# Patient Record
Sex: MALE | Race: WHITE
[De-identification: names, ages, dates, MRNs, and addresses within clinical notes are randomized per-mention and may not be internally consistent; named-entity substitution may affect disease eponyms.]

---

## 2017-09-29 ENCOUNTER — HOSPITAL ENCOUNTER (EMERGENCY)
Dept: HOSPITAL 25 - UCCORT | Age: 32
Discharge: HOME | End: 2017-09-29
Payer: SELF-PAY

## 2017-09-29 VITALS — SYSTOLIC BLOOD PRESSURE: 115 MMHG | DIASTOLIC BLOOD PRESSURE: 60 MMHG

## 2017-09-29 DIAGNOSIS — F17.210: ICD-10-CM

## 2017-09-29 DIAGNOSIS — K04.7: Primary | ICD-10-CM

## 2017-09-29 PROCEDURE — 99212 OFFICE O/P EST SF 10 MIN: CPT

## 2017-09-29 PROCEDURE — G0463 HOSPITAL OUTPT CLINIC VISIT: HCPCS

## 2017-09-29 NOTE — UC
Throat Pain/Nasal Andres HPI





- HPI Summary


HPI Summary: 





32 YEAR OLD MALE PRESENTS WITH RIGHT LOWER MOLAR ABSCESS.





- History of Current Complaint


Stated Complaint: LUMP UNDER JAW,THROAT/EAR PAIN


Time Seen by Provider: 09/29/17 19:49


Hx Obtained From: Patient


Onset/Duration: Sudden Onset


Severity: Moderate


Pain Scale Used: 0-10 Numeric - 8





- Allergies/Home Medications


Allergies/Adverse Reactions: 


 Allergies











Allergy/AdvReac Type Severity Reaction Status Date / Time


 


No Known Allergies Allergy   Verified 04/13/15 19:58











Home Medications: 


 Home Medications





Aleve 220mg 660 mg PO Q4H PRN 09/29/17 [History Confirmed 09/29/17]


Amoxicillin PO (*) [Amoxicillin 500 MG CAP*] 500 mg PO TID 09/29/17 [History 

Confirmed 09/29/17]











PMH/Surg Hx/FS Hx/Imm Hx


Previously Healthy: Yes





- Surgical History


Surgical History: None





- Social History


Alcohol Use: Rare


Substance Use Type: None


Smoking Status (MU): Heavy Every Day Tobacco Smoker


Type: Cigarettes


Amount Used/How Often: 1 ppd


Length of Time of Smoking/Using Tobacco: 10 yrs


Have You Smoked in the Last Year: Yes





- Immunization History


Most Recent Tetanus Shot: 2-3 yrs ago





Review of Systems


Constitutional: Negative


Skin: Negative


Eyes: Negative


ENT: Dental Pain


Respiratory: Negative


Cardiovascular: Negative


Gastrointestinal: Negative


Genitourinary: Negative


Motor: Negative


Neurovascular: Negative


Musculoskeletal: Negative


Neurological: Negative


Psychological: Negative


All Other Systems Reviewed And Are Negative: Yes





Physical Exam


Triage Information Reviewed: Yes


Vital Signs Reviewed: Yes


Eye Exam: Normal


ENT Exam: Normal


Dental: Positive: Abscess @


Neck exam: Normal


Neck: Positive: 1


Respiratory Exam: Normal


Cardiovascular Exam: Normal


Abdominal Exam: Normal


Musculoskeletal Exam: Normal


Neurological Exam: Normal


Psychological Exam: Normal


Skin Exam: Normal





Throat Pain/Nasal Course/Dx





- Differential Dx/Diagnosis


Provider Diagnoses: RIGHT MOLAR ABSCESS





Discharge





- Discharge Plan


Condition: Stable


Disposition: HOME


Prescriptions: 


Chlorhexidine MW 0.12% 473ML* [Peridex Mouth Wash 0.12%*] 15 ml MT TID PC #1 btl


Clindamycin HCl [Clindamycin 150 MG CAP*] 300 mg PO TID #21 cap


Naproxen [Naprosyn 500 mg] 500 mg PO BID PRN #30 tab


 PRN Reason: Pain


Patient Education Materials:  Abscess (ED)


Referrals: 


No Primary Care Phys,NOPCP [Primary Care Provider] -

## 2017-10-27 ENCOUNTER — HOSPITAL ENCOUNTER (EMERGENCY)
Dept: HOSPITAL 25 - UCCORT | Age: 32
Discharge: HOME | End: 2017-10-27
Payer: SELF-PAY

## 2017-10-27 VITALS — DIASTOLIC BLOOD PRESSURE: 79 MMHG | SYSTOLIC BLOOD PRESSURE: 121 MMHG

## 2017-10-27 DIAGNOSIS — K04.7: Primary | ICD-10-CM

## 2017-10-27 DIAGNOSIS — F17.210: ICD-10-CM

## 2017-10-27 PROCEDURE — G0463 HOSPITAL OUTPT CLINIC VISIT: HCPCS

## 2017-10-27 PROCEDURE — 99212 OFFICE O/P EST SF 10 MIN: CPT

## 2017-10-27 NOTE — UC
Dental HPI





- HPI Summary


HPI Summary: 





patient has had increased pain and pressure in the lower jaw, multiple broken 

molar and peridontal disease noted.large amount of swelling.





- History of Current Complaint


Chief Complaint: UCDentalProblem


Stated Complaint: DENTAL


Time Seen by Provider: 10/27/17 19:13


Hx Obtained From: Patient


Onset/Duration: Sudden Onset, Lasting Days


Severity: Moderate


Aggravating Factor(s): Chewing


Alleviating Factor(s): Nothing


Related History: Previous Dental Care on Same Tooth, Swelling





- Allergies/Home Medications


Allergies/Adverse Reactions: 


 Allergies











Allergy/AdvReac Type Severity Reaction Status Date / Time


 


No Known Allergies Allergy   Verified 10/27/17 19:13














PMH/Surg Hx/FS Hx/Imm Hx


Previously Healthy: Yes





- Surgical History


Surgical History: None





- Family History


Known Family History: Positive: Hypertension





- Social History


Alcohol Use: Rare


Substance Use Type: None


Smoking Status (MU): Heavy Every Day Tobacco Smoker


Type: Cigarettes


Amount Used/How Often: 1 ppd


Length of Time of Smoking/Using Tobacco: 10 yrs


Have You Smoked in the Last Year: Yes





- Immunization History


Most Recent Tetanus Shot: 2-3 yrs ago





Review of Systems


Constitutional: Negative


Skin: Negative


Eyes: Negative


ENT: Dental Pain


Respiratory: Negative


Cardiovascular: Negative


Gastrointestinal: Negative


Genitourinary: Negative


Motor: Negative


Neurovascular: Negative


Musculoskeletal: Negative


Neurological: Negative


Psychological: Negative


Is Patient Immunocompromised?: No


All Other Systems Reviewed And Are Negative: Yes





Physical Exam


Triage Information Reviewed: Yes


Appearance: Well-Appearing, Well-Nourished, Pain Distress


Vital Signs: 


 Initial Vital Signs











Temp  98.8 F   10/27/17 19:10


 


Pulse  85   10/27/17 19:10


 


Resp  16   10/27/17 19:10


 


BP  121/79   10/27/17 19:10


 


Pulse Ox  98   10/27/17 19:10











Vital Signs Reviewed: Yes


Eye Exam: Normal


ENT Exam: Normal


ENT: Positive: Pharynx normal, Pharyngeal erythema, TMs normal


Dental Exam: Normal


Dental: Positive: Percussion Tenderness @, Gross Decay/Caries @, Dental 

Fracture @, Abscess @ - lower right jaw, Cervical Lymphadenopathy - right side


Neck exam: Normal


Neck: Positive: Supple, Nontender, No Lymphadenopathy


Respiratory Exam: Normal


Respiratory: Positive: Chest non-tender, Lungs clear, Normal breath sounds


Cardiovascular Exam: Normal


Cardiovascular: Positive: RRR, No Murmur, Pulses Normal


Abdominal Exam: Normal


Abdomen Description: Positive: Nontender, No Organomegaly, Soft


Bowel Sounds: Positive: Present


Musculoskeletal Exam: Normal


Musculoskeletal: Positive: Strength Intact, ROM Intact, No Edema


Neurological Exam: Normal


Neurological: Positive: Alert, Muscle Tone Normal


Psychological Exam: Normal


Skin Exam: Normal





Dental Complaint Course/Dx





- Course


Course Of Treatment: hx obtained, exam performed ,med reviewed, treated for 

large dental abcess





- Differential Dx/Diagnosis


Differential Diagnosis/Dx: Dental Abscess, Dental Caries, Fractured Tooth, 

Peridontic Disease


Provider Diagnoses: dental abcess of bottom molars





Discharge





- Discharge Plan


Condition: Stable


Disposition: HOME


Prescriptions: 


Penicillin  MG TAB(NF) [Penicillin  mg Tab] 500 mg PO QID #28 tab


Patient Education Materials:  Dental Abscess (ED)


Referrals: 


No Primary Care Phys,NOPCP [Primary Care Provider] - 


Additional Instructions: 


1. take the medication as prescribed.


2. Increase warm compresses and gargling


3. Eat as tolerated


4. continue with ibuprofen for pain

## 2019-04-25 ENCOUNTER — HOSPITAL ENCOUNTER (EMERGENCY)
Dept: HOSPITAL 25 - UCCORT | Age: 34
Discharge: HOME | End: 2019-04-25
Payer: COMMERCIAL

## 2019-04-25 VITALS — DIASTOLIC BLOOD PRESSURE: 80 MMHG | SYSTOLIC BLOOD PRESSURE: 127 MMHG

## 2019-04-25 DIAGNOSIS — F17.210: ICD-10-CM
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
## 2019-04-25 DIAGNOSIS — S61.235A: Primary | ICD-10-CM

## 2019-04-25 DIAGNOSIS — S61.431A: ICD-10-CM

## 2019-04-25 PROCEDURE — G0463 HOSPITAL OUTPT CLINIC VISIT: HCPCS

## 2019-04-25 PROCEDURE — 99212 OFFICE O/P EST SF 10 MIN: CPT

## 2019-04-25 PROCEDURE — 90471 IMMUNIZATION ADMIN: CPT

## 2019-04-25 PROCEDURE — 90715 TDAP VACCINE 7 YRS/> IM: CPT

## 2019-04-25 NOTE — UC
Hand/Wrist HPI





- HPI Summary


HPI Summary: 





Went under his house today to shut off a valve, lost his balance and landed 

with his left hand onto a board with protruding nails, punctured the proximal 

thenar eminence and medial 4th digit. Washed the wound without pressure and 

went to work for the afternoon, with increasing pain over the course of the 

day. 


Following the injury, he returned to his work as an , and he was 

working on construction of a barn roof, with wounds covered. He states that he 

did not think that he did anything to contaminate the wounds. 





- History Of Current Complaint


Chief Complaint: Yoel


Stated Complaint: PUNCTURE WOUNDS - LEFT HAND


Time Seen by Provider: 04/25/19 19:23


Hx Obtained From: Patient


Onset/Duration: Sudden Onset


Severity Initially: Moderate


Severity Currently: Severe


Pain Intensity: 9


Character Of Pain: Aching, Throbbing


Aggravating Factor(s): Movement, Flexion, Extension


Alleviating Factor(s): Nothing - had not taken analgesics


Associated Signs And Symptoms: Positive: Swelling


Related History: Dominant Hand Right





- Risk Factors


Compartment Syndrome Risk Factors: Pain





- Allergies/Home Medications


Allergies/Adverse Reactions: 


 Allergies











Allergy/AdvReac Type Severity Reaction Status Date / Time


 


No Known Allergies Allergy   Verified 04/25/19 19:23














PMH/Surg Hx/FS Hx/Imm Hx


Previously Healthy: Yes





- Surgical History


Surgical History: None





- Family History


Known Family History: Positive: Hypertension





- Social History


Occupation: Employed Full-time


Lives: With Family


Alcohol Use: Rare


Substance Use Type: None


Smoking Status (MU): Heavy Every Day Tobacco Smoker


Type: Cigarettes


Amount Used/How Often: 1 ppd


Length of Time of Smoking/Using Tobacco: 10 yrs


Have You Smoked in the Last Year: Yes





- Immunization History


Most Recent Tetanus Shot: 2008





Review of Systems


All Other Systems Reviewed And Are Negative: Yes


Musculoskeletal: Positive: Arthralgia, Decreased ROM, Myalgia





Physical Exam


Triage Information Reviewed: Yes


Appearance: Well-Appearing, Well-Nourished, Pain Distress - moderate


Vital Signs: 


 Initial Vital Signs











Temp  98.2 F   04/25/19 19:16


 


Pulse  89   04/25/19 19:16


 


Resp  16   04/25/19 19:16


 


BP  127/80   04/25/19 19:16


 


Pulse Ox  97   04/25/19 19:16











Respiratory: Positive: Lungs clear, Normal breath sounds


Cardiovascular: Positive: RRR, No Murmur


Musculoskeletal Exam: Other - Swelling of the hypothenar eminence with crusted 

puncture wound approx 6 cm. Puncture wound with crust proximal medial left 

fourth digit. Areas are tender to palpation. No joint swelling


Musculoskeletal: Positive: ROM Intact - passive movment of the wrist, ROM 

Limited @ - pain with extension of fingers.


Neurological Exam: Normal


Neurological: Positive: Alert


Skin Exam: Other - Faint erythema over the PW ulnar border of the hand, no 

warmth, no lymphangitis.





Diagnostics





- Radiology


  ** left hand


Radiology Interpretation Completed By: ED Physician - No fracture or retained 

foreign body seen. + swelling of hypothenar eminence.





Hand/Wrist Course/Dx





- Course


Course Of Treatment: 





cephalexin given for prevention of infection. Advised must go to the ER if pain 

and swelling progress. 


Tetanus booster given


Reviewed [facility] number [id number]





- Differential Dx/Diagnosis


Differential Diagnosis/HQI/PQRI: Cellulitis, Puncture Wound


Provider Diagnosis: 


 Puncture wound of hand, left, Puncture wound of finger of left hand








Discharge





- Sign-Out/Discharge


Documenting (check all that apply): Patient Departure


All imaging exams completed and their final reports reviewed: No





- Discharge Plan


Condition: Stable


Disposition: HOME


Prescriptions: 


Cephalexin CAP* [Keflex 500 CAP*] 500 mg PO QID #28 cap


HYDROcodone/ACETAMIN 5-325 MG* [Norco 5-325 TAB*] 2 tab PO Q6H PRN #10 tab MDD 8


 PRN Reason: Pain


Patient Education Materials:  Puncture Wound (ED), Diphtheria/Acellular 

Pertussis/Tetanus Booster Vaccine (By injection)


Referrals: 


No Primary Care Phys,NOPCP [Primary Care Provider] - 


Additional Instructions: 


I suggest soaking your hand for 10 to 15 minutes in warm soapy water or Epsom 

salts. 


Take the next dose of cephalexin before bed tonight to ensure a good high dose 

of antibiotics are on board to decrease the risk of infection. 


If there is increased pain or swelling, you must go to the emergency room for 

assessment. 


Keep your hand elevated as best as possible. 


Use ibuprofen 800mg every 6 hours as needed for pain. Take with food and stop 

if it upsets your stomach. 


You have a prescription for short term use of hydrocodone for pain control


You received a tetanus booster tonight.  





- Billing Disposition and Condition


Condition: STABLE


Disposition: Home

## 2019-04-26 NOTE — UC
- Progress Note


Progress Note: 





xray report left hand : IMPRESSION: No foreign body or subcutaneous air is 

noted. No fracture is noted.








Course/Dx





- Diagnoses


Provider Diagnoses: 


 Puncture wound of hand, left, Puncture wound of finger of left hand








Discharge





- Sign-Out/Discharge


Documenting (check all that apply): Patient Departure


All imaging exams completed and their final reports reviewed: Yes





- Discharge Plan


Condition: Stable


Disposition: HOME


Prescriptions: 


Cephalexin CAP* [Keflex 500 CAP*] 500 mg PO QID #28 cap


HYDROcodone/ACETAMIN 5-325 MG* [Norco 5-325 TAB*] 2 tab PO Q6H PRN #10 tab MDD 8


 PRN Reason: Pain


Patient Education Materials:  Diphtheria/Acellular Pertussis/Tetanus Booster 

Vaccine (By injection), Puncture Wound (ED)


Referrals: 


No Primary Care Phys,NOPCP [Primary Care Provider] - 


Additional Instructions: 


I suggest soaking your hand for 10 to 15 minutes in warm soapy water or Epsom 

salts. 


Take the next dose of cephalexin before bed tonight to ensure a good high dose 

of antibiotics are on board to decrease the risk of infection. 


If there is increased pain or swelling, you must go to the emergency room for 

assessment. 


Keep your hand elevated as best as possible. 


Use ibuprofen 800mg every 6 hours as needed for pain. Take with food and stop 

if it upsets your stomach. 


You have a prescription for short term use of hydrocodone for pain control


You received a tetanus booster tonight.  





- Billing Disposition and Condition


Condition: STABLE


Disposition: Home

## 2019-07-08 ENCOUNTER — HOSPITAL ENCOUNTER (EMERGENCY)
Dept: HOSPITAL 25 - UCCORT | Age: 34
Discharge: HOME | End: 2019-07-08
Payer: COMMERCIAL

## 2019-07-08 VITALS — SYSTOLIC BLOOD PRESSURE: 110 MMHG | DIASTOLIC BLOOD PRESSURE: 71 MMHG

## 2019-07-08 DIAGNOSIS — J02.9: ICD-10-CM

## 2019-07-08 DIAGNOSIS — R05: ICD-10-CM

## 2019-07-08 DIAGNOSIS — F17.210: ICD-10-CM

## 2019-07-08 DIAGNOSIS — J06.9: Primary | ICD-10-CM

## 2019-07-08 PROCEDURE — 71046 X-RAY EXAM CHEST 2 VIEWS: CPT

## 2019-07-08 PROCEDURE — 87651 STREP A DNA AMP PROBE: CPT

## 2019-07-08 PROCEDURE — 99212 OFFICE O/P EST SF 10 MIN: CPT

## 2019-07-08 PROCEDURE — G0463 HOSPITAL OUTPT CLINIC VISIT: HCPCS

## 2019-07-08 NOTE — UC
UC General HPI





- HPI Summary


HPI Summary: 





pt is c/o head congestion, sore throat, cough, chest congestion, f/c's and 

bodyaches since yesterday.


+ SOB on exertion.


No cp or hx asthma





- History of Current Complaint


Chief Complaint: UCGeneralIllness


Stated Complaint: BODY ACHES,FATIGUE,SOB


Time Seen by Provider: 07/08/19 18:57


Hx Obtained From: Patient


Onset/Duration: Gradual Onset


Timing: Constant


Pain Intensity: 8


Associated Signs & Symptoms: Negative: Diarrhea, Dysuria, Vomiting





- Allergy/Home Medications


Allergies/Adverse Reactions: 


 Allergies











Allergy/AdvReac Type Severity Reaction Status Date / Time


 


No Known Allergies Allergy   Verified 07/08/19 18:29











Home Medications: 


 Home Medications





NK [No Home Medications Reported]  07/08/19 [History Confirmed 07/08/19]











PMH/Surg Hx/FS Hx/Imm Hx


Previously Healthy: Yes





- Surgical History


Surgical History: None





- Family History


Known Family History: Positive: Hypertension





- Social History


Occupation: Employed Full-time


Alcohol Use: Rare


Substance Use Type: None


Smoking Status (MU): Heavy Every Day Tobacco Smoker


Type: Cigarettes


Amount Used/How Often: 1 ppd


Length of Time of Smoking/Using Tobacco: 10 yrs


Have You Smoked in the Last Year: Yes





- Immunization History


Most Recent Tetanus Shot: 2008





Review of Systems


All Other Systems Reviewed And Are Negative: Yes


Constitutional: Positive: Fever, Chills, Fatigue


Skin: Negative: Rash


ENT: Positive: Sore Throat, Sinus Congestion.  Negative: Ear Ache


Respiratory: Positive: Shortness Of Breath, Cough


Cardiovascular: Negative: Palpitations, Chest Pain


Gastrointestinal: Negative: Abdominal Pain, Vomiting, Diarrhea, Nausea


Genitourinary: Negative: Dysuria


Musculoskeletal: Positive: Myalgia





Physical Exam


Triage Information Reviewed: Yes


Appearance: Ill-Appearing - but non toxic


Vital Signs: 


 Initial Vital Signs











Temp  99.3 F   07/08/19 18:27


 


Pulse  73   07/08/19 18:27


 


Resp  16   07/08/19 18:27


 


BP  110/71   07/08/19 18:27


 


Pulse Ox  98   07/08/19 18:27











Vital Signs Reviewed: Yes


Eyes: Positive: Conjunctiva Clear


ENT: Positive: Pharyngeal erythema, TMs normal.  Negative: Nasal congestion, 

Nasal drainage, Sinus tenderness


Neck: Positive: Supple, Tenderness @ - peritonsilar, Enlarged Nodes @ - 

peritonsilar


Respiratory: Positive: No respiratory distress, Decreased breath sounds - 

slightly, Other: - cough is congested


Cardiovascular: Positive: RRR, No Murmur


Abdomen Description: Positive: Nontender


Musculoskeletal: Positive: ROM Intact


Neurological: Positive: Alert


Psychological: Positive: Age Appropriate Behavior


Skin Exam: Normal


Skin: Negative: Rashes





Diagnostics





- Laboratory


Lab Results: 





rapid strep=neg.





- Radiology


  ** No standard instances


Radiology Interpretation Completed By: ED Physician - wet read=nad





Course/Dx





- Differential Dx - Multi-Symptom


Differential Diagnoses: Other - non toxic. not hypoxic. rapid strep=neg. cxr=

nad.





- Diagnoses


Provider Diagnosis: 


 URI (upper respiratory infection), Pharyngitis, Cough








Discharge





- Sign-Out/Discharge


Documenting (check all that apply): Patient Departure


All imaging exams completed and their final reports reviewed: No





- Discharge Plan


Condition: Stable


Disposition: HOME


Patient Education Materials:  Upper Respiratory Infection (DC), Pharyngitis (ED)

, Acute Cough (ED)


Forms:  *Work Release


Referrals: 


Gianfranco Vilchis MD [Medical Doctor] - 


Additional Instructions: 


FOLLOW UP IF NOT BETTER WITHIN 5 DAYS OR SOONER IF WORSE.





- Billing Disposition and Condition


Condition: STABLE


Disposition: Home

## 2019-07-09 NOTE — UC
- Progress Note


Progress Note: 





chest xray report: 


IMPRESSION:


#. No evidence for pneumonia.


#. Elevated lung volumes may reflect obstructive lung disease or simply 

exuberant


inspiratory effort for examination.








Course/Dx





- Diagnoses


Provider Diagnoses: 


 URI (upper respiratory infection), Pharyngitis, Cough








Discharge





- Sign-Out/Discharge


Documenting (check all that apply): Patient Departure


All imaging exams completed and their final reports reviewed: Yes





- Discharge Plan


Condition: Stable


Disposition: HOME


Patient Education Materials:  Pharyngitis (ED), Upper Respiratory Infection (DC)

, Acute Cough (ED)


Forms:  *Work Release


Referrals: 


Gianfranco Vilchis MD [Medical Doctor] - 


Additional Instructions: 


FOLLOW UP IF NOT BETTER WITHIN 5 DAYS OR SOONER IF WORSE.





- Billing Disposition and Condition


Condition: STABLE


Disposition: Home

## 2019-12-28 ENCOUNTER — HOSPITAL ENCOUNTER (EMERGENCY)
Dept: HOSPITAL 25 - UCCORT | Age: 34
Discharge: HOME | End: 2019-12-28
Payer: COMMERCIAL

## 2019-12-28 VITALS — DIASTOLIC BLOOD PRESSURE: 66 MMHG | SYSTOLIC BLOOD PRESSURE: 105 MMHG

## 2019-12-28 DIAGNOSIS — H60.502: Primary | ICD-10-CM

## 2019-12-28 DIAGNOSIS — F17.210: ICD-10-CM

## 2019-12-28 PROCEDURE — G0463 HOSPITAL OUTPT CLINIC VISIT: HCPCS

## 2019-12-28 PROCEDURE — 99212 OFFICE O/P EST SF 10 MIN: CPT

## 2019-12-28 NOTE — UC
Ear Complaint HPI





- HPI Summary


HPI Summary: 





Left ear pain for one week. No recent cold symptoms.Outer ear is very tender to 

touch. No drainage from ear. Mild muffling of hearing. 





- History of Current Complaint


Chief Complaint: UCEar


Stated Complaint: LEFT EAR COMPLAINT


Time Seen by Provider: 12/28/19 16:44


Hx Obtained From: Patient


Pain Intensity: 8


Pain Scale Used: 0-10 Numeric


Aggravating Factors: Nothing


Alleviating Factors: Nothing





- Allergies/Home Medications


Allergies/Adverse Reactions: 


 Allergies











Allergy/AdvReac Type Severity Reaction Status Date / Time


 


No Known Allergies Allergy   Verified 12/28/19 16:21











Home Medications: 


 Home Medications





Naproxen Sodium [Aleve] 220 mg PO PRN 12/28/19 [History]











PMH/Surg Hx/FS Hx/Imm Hx





- Additional Past Medical History


Additional PMH: 





no chronic condition


Previously Healthy: Yes





- Surgical History


Surgical History: Yes


Surgery Procedure, Year, and Place: ear tubes as a child





- Family History


Known Family History: Positive: Hypertension





- Social History


Alcohol Use: Rare


Substance Use Type: None


Smoking Status (MU): Heavy Every Day Tobacco Smoker


Type: Cigarettes


Amount Used/How Often: 1 ppd


Length of Time of Smoking/Using Tobacco: 10 yrs


Have You Smoked in the Last Year: Yes


Household Exposure Type: Cigarettes





- Immunization History


Most Recent Tetanus Shot: 2008





Review of Systems


All Other Systems Reviewed And Are Negative: Yes


Constitutional: Negative: Fever


Skin: Negative: Rash


ENT: Positive: Ear Ache


Respiratory: Negative: Cough





Physical Exam


Triage Information Reviewed: Yes


Appearance: Well-Appearing


Vital Signs: 


 Initial Vital Signs











Temp  99.3 F   12/28/19 16:22


 


Pulse  74   12/28/19 16:22


 


Resp  18   12/28/19 16:22


 


BP  105/66   12/28/19 16:22


 


Pulse Ox  97   12/28/19 16:22











Vital Signs Reviewed: Yes


Eyes: Positive: Conjunctiva Clear


ENT: Positive: Pharynx normal, TMs normal - R side, Other - L TM normal but 

canal is inflammed w/ debris and tender upon otoscope insertion.  no mastoid 

tenderness and some pain w/ auricle manipulation


Neck: Positive: Supple





Ear Complaint Course/Dx





- Course


Course Of Treatment: 





L OE; acute.  Will tx w/ antibx but advised to return if he feels the cartilage 

is getting involved.  AFebrile and good vitals.





- Differential Dx/Diagnosis


Differential Diagnosis/HQI/PQRI: Otitis Externa, Otitis Media, Other


Provider Diagnosis: 


 Otitis externa








Discharge ED





- Sign-Out/Discharge


Documenting (check all that apply): Patient Departure


All imaging exams completed and their final reports reviewed: No Studies





- Discharge Plan


Condition: Good


Disposition: HOME


Prescriptions: 


Ciproflox/Dexameth OTIC.SUSP* [Ciprodex OTIC.SUSP*] 4 drop .SEE ORDER BID 7 

Days #1 btl


Patient Education Materials:  Otitis Externa (DC)


Referrals: 


No Primary Care Phys,NOPCP [Primary Care Provider] - 


Additional Instructions: 


Please finish full 7 days of treatment and return if symptoms persist.





- Billing Disposition and Condition


Condition: GOOD


Disposition: Home





- Attestation Statements


Provider Attestation: 





This patient was not seen by me


I was available for consult


Chart reviewed





[id number]

## 2020-04-01 ENCOUNTER — HOSPITAL ENCOUNTER (EMERGENCY)
Dept: HOSPITAL 25 - UCCORT | Age: 35
Discharge: HOME | End: 2020-04-01
Payer: COMMERCIAL

## 2020-04-01 VITALS — SYSTOLIC BLOOD PRESSURE: 108 MMHG | DIASTOLIC BLOOD PRESSURE: 70 MMHG
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
## 2020-04-01 DIAGNOSIS — T15.01XA: Primary | ICD-10-CM
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
## 2020-04-01 DIAGNOSIS — F17.210: ICD-10-CM

## 2020-04-01 PROCEDURE — 99213 OFFICE O/P EST LOW 20 MIN: CPT

## 2020-04-01 PROCEDURE — G0463 HOSPITAL OUTPT CLINIC VISIT: HCPCS

## 2020-04-01 NOTE — UC
Eye Complaint HPI





- HPI Summary


HPI Summary: 





33 y/o male presents to the urgent care c/o RT eye pain w/ redness and a FB 

sensation since yesterday.  Pt reports he was working under his car and felt 

dust went into his eye. He tried to removed it w/ a cotton swab and felt 

better.  However 2 hrs later he was rubbing it and he developed eye irritation. 

This morning his eye was more red w/  clear eye discharge and  a FB sensation. 

He has mild photophobia. He can read, but it is uncomfortable to keep his eye 

open. Pain is 7/10 and irritated. Pt denies visual changes, HA, dizziness, fever

, cough, SOB, sick contact, abdominal pain, chest pain, N/V/D.  Pt is UTD w 

Tetanus vaccines which was given in 2019. 








- History of Current Complaint


Stated Complaint: RIGHT EYE FOREIGN BODY


Time Seen by Provider: 04/01/20 18:28


Hx Obtained From: Patient


Onset/Duration: Gradual Onset, Lasting Days - 1 day, Still Present


Timing: Constant


Severity Initially: Moderate


Severity Currently: Moderate


Pain Intensity: 7 - RT eye irritation


Pain Scale Used: 0-10 Numeric


Location of Injury: Conjunctiva - RT red w/ FB sensation


Character: Foreign Body Sensation


Aggravating Factor(s): Light, Blinking


Alleviating Factor(s): Nothing


Associated Signs And Symptoms: Positive: Drainage (Clear).  Negative: Vision 

Impairment Bilateral, Swelling


Related History: Foreign Body - Pt was working under his car and a FB got into 

his RT eye





- Risk Factors


Penetrating Injury Risk Factor: Negative


Globe Rupture Risk Factors: Negative


Acute Glaucoma Risk Factors: Negative


Optic Artery Occlusion Risk Factors: Negative





- Allergies/Home Medications


Allergies/Adverse Reactions: 


 Allergies











Allergy/AdvReac Type Severity Reaction Status Date / Time


 


No Known Allergies Allergy   Verified 04/01/20 18:27











Home Medications: 


 Home Medications





Erythromycin OPTH OINT* [Erythromycin 0.5% OPTH OINT*] 1 applic RIGHT EYE TID #

1 ophth.oint 04/01/20 [Rx]











PMH/Surg Hx/FS Hx/Imm Hx


Previously Healthy: Yes - Pt denies PMHX





- Surgical History


Surgical History: Yes


Surgery Procedure, Year, and Place: ear tubes as a child





- Family History


Known Family History: Positive: Hypertension, Diabetes





- Social History


Occupation: Employed Full-time


Lives: With Family


Alcohol Use: Rare


Substance Use Type: None


Smoking Status (MU): Heavy Every Day Tobacco Smoker


Type: Cigarettes


Amount Used/How Often: 1/2 ppd


Length of Time of Smoking/Using Tobacco: 10 yrs


Have You Smoked in the Last Year: Yes


Household Exposure Type: Cigarettes





- Immunization History


Most Recent Tetanus Shot: 2008





Review of Systems


All Other Systems Reviewed And Are Negative: Yes


Constitutional: Positive: Negative


Skin: Positive: Negative


Eyes: Positive: Drainage - RT eye clear, Eye Redness - RT eye w/ FB sensation, 

Photophobia - mild


ENT: Positive: Negative


Respiratory: Positive: Negative


Cardiovascular: Positive: Negative


Gastrointestinal: Positive: Negative


Genitourinary: Positive: Negative


Motor: Positive: Negative


Neurovascular: Positive: Negative


Musculoskeletal: Positive: Negative


Neurological/Mental Status: Positive: Negative


Psychological: Positive: Negative


Is Patient Immunocompromised?: No





Physical Exam





- Summary


Physical Exam Summary: 





Vital Signs Reviewed: Yes


General: Well appearing, well  nourished  male in no apparent pain distress


Eyes: Positive: RT Conjunctiva Inflamed - Visual acuity: WNL,Visual fields: 

full to confrontation.PERRLA,  EOMI intact w/out limitation or complaint of 

pain.  eyelashes clear. mild tearing and yellowish drainage observed. Positive 

FB body observed around 3 o'clock w/ the magnifying glass.  No ciliary flush.  

No chemosis, No photophobia.  Normal fundoscopic exam; no proptosis, 

exophthalmos, nystagmus.


ENT: Positive: Normal ENT inspection, Hearing grossly normal, Pharynx normal, 

Nasal congestion, Nasal drainage - clear, TMs normal - B/L external ear canal 

clear , TM's WNL.  Negative: Tonsillar swelling, Tonsillar exudate


Neck: Positive: Supple, Nontender, No Lymphadenopathy


Respiratory: Positive: Chest nontender, Lungs clear, Normal breath sounds, No 

respiratory distress


Cardiovascular: Positive: RRR, No Murmur, Pulses Normal, Brisk Capillary Refill


Abdomen Description: Positive: Nontender, No Organomegaly, Soft.  Negative: CVA 

Tenderness (R), CVA Tenderness (L)


Bowel Sounds: Positive: Present


Musculoskeletal: Positive: Strength Intact, ROM Intact, No Edema


Neurological Exam: Normal


Psychological Exam: Normal


Skin Exam: Normal








Triage Information Reviewed: Yes


Vital Signs: 


 Initial Vital Signs











Temp  98 F   04/01/20 18:27


 


Pulse  70   04/01/20 18:27


 


Resp  18   04/01/20 18:27


 


BP  108/70   04/01/20 18:27


 


Pulse Ox  98   04/01/20 18:27














Eye Complaint Course/Dx





- Course


Course Of Treatment: 


33 y/o male presents to the urgent care c/o RT eye pain w/ redness and a FB 

sensation since yesterday.  Pt reports he was working under his car and felt 

dust went into his eye. He tried to removed it w/ a cotton swab and felt 

better.  However 2 hrs later he was rubbing it and he developed eye irritation. 

This morning his eye was more red w/  clear eye discharge and  a FB sensation. 

He has mild photophobia. He can read, but it is uncomfortable to keep his eye 

open. Pain is 7/10 and irritated. Pt denies visual changes, HA, dizziness, fever

, cough, SOB, sick contact, abdominal pain, chest pain, N/V/D.  Pt is UTD w 

Tetanus vaccines which was given in 2019. Hx obtained. LF eye with conjunctiva 

clear, sclera is white.  RT eye with inflamed conjunctiva and clear eye 

discharge.  B/L PERRLA, EOMI w/o any nystagmus or strabismus.  Fundi appears 

benign.  Disks are well delineated.  There are no hemorrhages or exudates.  PT 

declines Visual acuity due to eye irritation and visual fields are within 

normal limits.  Positive  foreign body observed w/ magnifying glass around 3 o'

clock. 2 drops of  Tetracaine optha drops placed on Pts RT eye, then irrigated 

with saline drops to flush any foreign particles, then fluorescein instillation 

and examination with a UV lamp.  Positive corneal abrasion observed as a line 

between  3-6 oclock.  FB unable to removed. Erythromycin ophthalmic oint 

applied on his RT eye and then covered w/ and eye patch for 1 day for comfort . 

PT  Rx Erythromycin ophthalmic ointment and advised to f/u at [facility] 

ophthalmology [place].  Pt understood and agreed.








- Differential Dx/Diagnosis


Differential Diagnosis/HQI/PQRI: Corneal Abrasion, Foreign Body


Provider Diagnosis: 


 Right corneal abrasion, Foreign body of right eye








Discharge ED





- Sign-Out/Discharge


Documenting (check all that apply): Patient Departure - D/C home


All imaging exams completed and their final reports reviewed: No Studies





- Discharge Plan


Condition: Stable


Disposition: HOME


Prescriptions: 


Erythromycin OPTH OINT* [Erythromycin 0.5% OPTH OINT*] 1 applic RIGHT EYE TID #

1 ophth.oint


Patient Education Materials:  Corneal Abrasion (ED), Eye Foreign Body (ED)


Referrals: 


[facility] [place] [facility] [Outside] - If Needed


Bettina Sewell MD [Medical Doctor] - 1 Day


Arnaud Ross MD [Medical Doctor] - 1 Day


Additional Instructions: 


1-Please apply ophthalmic ointment in your Rt eye as directed. Please keep your 

eye cover w/ eye patch for 1 day.


2- Take Ibuprofen or Tylenol PO q6-8hrs prn after meals  as directed to 

alleviate symptoms of pain and swelling


3- Please f/u w/  ophthalmologist Dr Sewell or DR Ross tomorrow for 

further evaluation and treatment on your eye Foreign body and corneal abrasion








- Billing Disposition and Condition


Condition: STABLE


Disposition: Home